# Patient Record
Sex: MALE | Race: WHITE | NOT HISPANIC OR LATINO | URBAN - METROPOLITAN AREA
[De-identification: names, ages, dates, MRNs, and addresses within clinical notes are randomized per-mention and may not be internally consistent; named-entity substitution may affect disease eponyms.]

---

## 2018-06-02 ENCOUNTER — EMERGENCY (EMERGENCY)
Facility: HOSPITAL | Age: 58
LOS: 1 days | Discharge: LEFT BEFORE DISCHARGE | End: 2018-06-02
Attending: EMERGENCY MEDICINE | Admitting: EMERGENCY MEDICINE
Payer: COMMERCIAL

## 2018-06-02 VITALS — RESPIRATION RATE: 24 BRPM | DIASTOLIC BLOOD PRESSURE: 94 MMHG | SYSTOLIC BLOOD PRESSURE: 168 MMHG | HEART RATE: 127 BPM

## 2018-06-02 VITALS
DIASTOLIC BLOOD PRESSURE: 85 MMHG | SYSTOLIC BLOOD PRESSURE: 167 MMHG | OXYGEN SATURATION: 100 % | RESPIRATION RATE: 16 BRPM | HEART RATE: 78 BPM | TEMPERATURE: 98 F

## 2018-06-02 LAB
ALBUMIN SERPL ELPH-MCNC: 4.3 G/DL — SIGNIFICANT CHANGE UP (ref 3.3–5)
ALP SERPL-CCNC: 77 U/L — SIGNIFICANT CHANGE UP (ref 40–120)
ALT FLD-CCNC: 16 U/L — SIGNIFICANT CHANGE UP (ref 4–41)
APAP SERPL-MCNC: < 15 UG/ML — LOW (ref 15–25)
APTT BLD: 26.1 SEC — LOW (ref 27.5–37.4)
AST SERPL-CCNC: 16 U/L — SIGNIFICANT CHANGE UP (ref 4–40)
BASE EXCESS BLDV CALC-SCNC: 3.2 MMOL/L — SIGNIFICANT CHANGE UP
BASOPHILS # BLD AUTO: 0.03 K/UL — SIGNIFICANT CHANGE UP (ref 0–0.2)
BASOPHILS NFR BLD AUTO: 0.4 % — SIGNIFICANT CHANGE UP (ref 0–2)
BILIRUB SERPL-MCNC: 0.4 MG/DL — SIGNIFICANT CHANGE UP (ref 0.2–1.2)
BLOOD GAS VENOUS - CREATININE: 0.78 MG/DL — SIGNIFICANT CHANGE UP (ref 0.5–1.3)
BUN SERPL-MCNC: 18 MG/DL — SIGNIFICANT CHANGE UP (ref 7–23)
CALCIUM SERPL-MCNC: 8.9 MG/DL — SIGNIFICANT CHANGE UP (ref 8.4–10.5)
CHLORIDE BLDV-SCNC: 107 MMOL/L — SIGNIFICANT CHANGE UP (ref 96–108)
CHLORIDE SERPL-SCNC: 102 MMOL/L — SIGNIFICANT CHANGE UP (ref 98–107)
CO2 SERPL-SCNC: 25 MMOL/L — SIGNIFICANT CHANGE UP (ref 22–31)
CREAT SERPL-MCNC: 0.88 MG/DL — SIGNIFICANT CHANGE UP (ref 0.5–1.3)
EOSINOPHIL # BLD AUTO: 0.1 K/UL — SIGNIFICANT CHANGE UP (ref 0–0.5)
EOSINOPHIL NFR BLD AUTO: 1.2 % — SIGNIFICANT CHANGE UP (ref 0–6)
ETHANOL BLD-MCNC: < 10 MG/DL — SIGNIFICANT CHANGE UP
GAS PNL BLDV: 135 MMOL/L — LOW (ref 136–146)
GLUCOSE BLDV-MCNC: 221 — HIGH (ref 70–99)
GLUCOSE SERPL-MCNC: 229 MG/DL — HIGH (ref 70–99)
HCO3 BLDV-SCNC: 25 MMOL/L — SIGNIFICANT CHANGE UP (ref 20–27)
HCT VFR BLD CALC: 42.7 % — SIGNIFICANT CHANGE UP (ref 39–50)
HCT VFR BLDV CALC: 47.2 % — SIGNIFICANT CHANGE UP (ref 39–51)
HGB BLD-MCNC: 14.5 G/DL — SIGNIFICANT CHANGE UP (ref 13–17)
HGB BLDV-MCNC: 15.4 G/DL — SIGNIFICANT CHANGE UP (ref 13–17)
IMM GRANULOCYTES # BLD AUTO: 0.02 # — SIGNIFICANT CHANGE UP
IMM GRANULOCYTES NFR BLD AUTO: 0.2 % — SIGNIFICANT CHANGE UP (ref 0–1.5)
INR BLD: 1.01 — SIGNIFICANT CHANGE UP (ref 0.88–1.17)
LACTATE BLDV-MCNC: 1.4 MMOL/L — SIGNIFICANT CHANGE UP (ref 0.5–2)
LYMPHOCYTES # BLD AUTO: 1.99 K/UL — SIGNIFICANT CHANGE UP (ref 1–3.3)
LYMPHOCYTES # BLD AUTO: 23.5 % — SIGNIFICANT CHANGE UP (ref 13–44)
MCHC RBC-ENTMCNC: 30 PG — SIGNIFICANT CHANGE UP (ref 27–34)
MCHC RBC-ENTMCNC: 34 % — SIGNIFICANT CHANGE UP (ref 32–36)
MCV RBC AUTO: 88.4 FL — SIGNIFICANT CHANGE UP (ref 80–100)
MONOCYTES # BLD AUTO: 0.58 K/UL — SIGNIFICANT CHANGE UP (ref 0–0.9)
MONOCYTES NFR BLD AUTO: 6.8 % — SIGNIFICANT CHANGE UP (ref 2–14)
NEUTROPHILS # BLD AUTO: 5.76 K/UL — SIGNIFICANT CHANGE UP (ref 1.8–7.4)
NEUTROPHILS NFR BLD AUTO: 67.9 % — SIGNIFICANT CHANGE UP (ref 43–77)
NRBC # FLD: 0 — SIGNIFICANT CHANGE UP
PCO2 BLDV: 50 MMHG — SIGNIFICANT CHANGE UP (ref 41–51)
PH BLDV: 7.37 PH — SIGNIFICANT CHANGE UP (ref 7.32–7.43)
PLATELET # BLD AUTO: 309 K/UL — SIGNIFICANT CHANGE UP (ref 150–400)
PMV BLD: 9.9 FL — SIGNIFICANT CHANGE UP (ref 7–13)
PO2 BLDV: 32 MMHG — LOW (ref 35–40)
POTASSIUM BLDV-SCNC: 3.7 MMOL/L — SIGNIFICANT CHANGE UP (ref 3.4–4.5)
POTASSIUM SERPL-MCNC: 3.9 MMOL/L — SIGNIFICANT CHANGE UP (ref 3.5–5.3)
POTASSIUM SERPL-SCNC: 3.9 MMOL/L — SIGNIFICANT CHANGE UP (ref 3.5–5.3)
PROT SERPL-MCNC: 7.1 G/DL — SIGNIFICANT CHANGE UP (ref 6–8.3)
PROTHROM AB SERPL-ACNC: 11.6 SEC — SIGNIFICANT CHANGE UP (ref 9.8–13.1)
RBC # BLD: 4.83 M/UL — SIGNIFICANT CHANGE UP (ref 4.2–5.8)
RBC # FLD: 12.5 % — SIGNIFICANT CHANGE UP (ref 10.3–14.5)
SALICYLATES SERPL-MCNC: < 5 MG/DL — LOW (ref 15–30)
SAO2 % BLDV: 57.4 % — LOW (ref 60–85)
SODIUM SERPL-SCNC: 140 MMOL/L — SIGNIFICANT CHANGE UP (ref 135–145)
TROPONIN T SERPL-MCNC: < 0.06 NG/ML — SIGNIFICANT CHANGE UP (ref 0–0.06)
TROPONIN T SERPL-MCNC: < 0.06 NG/ML — SIGNIFICANT CHANGE UP (ref 0–0.06)
WBC # BLD: 8.48 K/UL — SIGNIFICANT CHANGE UP (ref 3.8–10.5)
WBC # FLD AUTO: 8.48 K/UL — SIGNIFICANT CHANGE UP (ref 3.8–10.5)

## 2018-06-02 PROCEDURE — 71045 X-RAY EXAM CHEST 1 VIEW: CPT | Mod: 26

## 2018-06-02 PROCEDURE — 99285 EMERGENCY DEPT VISIT HI MDM: CPT | Mod: 25

## 2018-06-02 PROCEDURE — 93010 ELECTROCARDIOGRAM REPORT: CPT

## 2018-06-02 RX ORDER — ASPIRIN/CALCIUM CARB/MAGNESIUM 324 MG
324 TABLET ORAL ONCE
Qty: 0 | Refills: 0 | Status: COMPLETED | OUTPATIENT
Start: 2018-06-02 | End: 2018-06-02

## 2018-06-02 RX ORDER — SODIUM CHLORIDE 9 MG/ML
1000 INJECTION INTRAMUSCULAR; INTRAVENOUS; SUBCUTANEOUS ONCE
Qty: 0 | Refills: 0 | Status: COMPLETED | OUTPATIENT
Start: 2018-06-02 | End: 2018-06-02

## 2018-06-02 RX ADMIN — SODIUM CHLORIDE 1000 MILLILITER(S): 9 INJECTION INTRAMUSCULAR; INTRAVENOUS; SUBCUTANEOUS at 18:10

## 2018-06-02 RX ADMIN — Medication 324 MILLIGRAM(S): at 18:15

## 2018-06-02 RX ADMIN — Medication 1 MILLIGRAM(S): at 18:10

## 2018-06-02 NOTE — ED ADULT NURSE NOTE - CAS ED AMA FORM SIGNED YN
Yes/Pt A&Ox4, Patient educated about risks of leaving and understands they are leaving against medical advice.

## 2018-06-02 NOTE — ED PROVIDER NOTE - OBJECTIVE STATEMENT
Attending Bere: 57M with pmh DM (takes 20 u humalin, qam, qhs, humalong sliding scale with meals) presents with episode of emotional distress, mid-sternal chest pain, palpitations, and near syncope. Onset Early this afternoon. Started after he found out his gf is cheating on him. Pt also noted that his wbg was elevated at that time, >500, he took 20 u humalog to try and bring down. Denies having taken to harm self. States may have missed insulin dose with lunch. Chest pain mid-sterna, tight, severe, radiated somewhat to L shoulder, no radiation to back. Also with palpitations, sob, and shaking. Denies si/hi, auditory or visual hallucinations. Has been under great deal of stress recently, started paxil a few weeks ago but d/c'd a few days ago. Pt initially symptomatic with chest pain, sob, was crying, symptoms resolved s/p ativan and verbal de-escalation. Denies recent illnesses f/c, n/v/d, abd pain. Attending Bere: 57M with pmh DM (takes 20 u humalin, qam, qhs, humalong sliding scale with meals) presents with episode of emotional distress, mid-sternal chest pain, palpitations, and near syncope. Onset Early this afternoon. Started after he found out his gf is cheating on him. Pt also noted that his wbg was elevated at that time, >500, he took 20 u humalog to try and bring down. Denies having taken to harm self. States may have missed insulin dose with lunch. Chest pain mid-sterna, tight, severe, radiated somewhat to L shoulder, no radiation to back. Also with palpitations, sob, and shaking. Denies si/hi, auditory or visual hallucinations. Has been under great deal of stress recently, started paxil a few weeks ago but d/c'd a few days ago. Pt initially symptomatic with chest pain, sob, was crying, symptoms resolved s/p ativan and verbal de-escalation. Denies recent illnesses f/c, n/v/d, abd pain. Pt denies SI/HI, auditory or visual hallucinations.

## 2018-06-02 NOTE — ED PROVIDER NOTE - MEDICAL DECISION MAKING DETAILS
57M with IDDM presents with chest pain, sob, palpitations, s/p emotional distress. On arrival pt emotionally distraught, complaining of severe chest pain, sob, palpitations. S/p ativan and verbal de-escalation sx resolved. EKg shows sinus tachycardia without sig st changes. Check CBC eval for anemia, cmp eval for metabolic derangement. Troponin x 2. CXR. Give asa. Trend wbg. Plan to re-evaluate. - Jose Blackburn MD 57M with IDDM presents with chest pain, sob, palpitations, s/p emotional distress. On arrival pt emotionally distraught, complaining of severe chest pain, sob, palpitations. S/p ativan and verbal de-escalation sx resolved. EKg shows sinus tachycardia without sig st changes. Check CBC eval for anemia, cmp eval for metabolic derangement. Troponin x 2. CXR. Give asa. Neuro intact when calm, with extremely low suspicion cns pathology. Trend wbg. Plan to re-evaluate. - Jose Blackburn MD

## 2018-06-02 NOTE — ED PROVIDER NOTE - PHYSICAL EXAMINATION
PE recorded by attending Bere    Neuro exam once calm: CN II-XII intact. Visual fields intact. EOMI, PERRLA. Facial sensation equal bilat. Smile and eye closure equal bilat. Hearing intact bilat. Palate elevation equal, tongue protrusion midline. Lateral head rotation equal bilat. 5/5 strength UE and LE bilat. Sensation grossly intact. No pronator drift.

## 2018-06-02 NOTE — ED PROVIDER NOTE - PROGRESS NOTE DETAILS
DO Renan. Patient refusing to stay for repeat troponin and requesting to be discharged. Patient feeling improved and currently without chest pain. Would like to sign out AMA. Patient understands that given his chest pain on arrival, even though it was in setting of life stressor, he is at risk of having major cardiac event. He understands that he is at risk of death and is leaving the hospital without complete evaluation. Patient advised to return to emergency department if there is any further concerns. Provided cardiology follow up list. The patient is leaving against my medical advice. I have informed the patient about the risks, benefits, and alternatives to the evaluation and treatment of their condition. I have specifically noted the desire to obtain a repeat troponin given patient's chest pain at time of presentation, although pain now resolved. I also have noted the possibility that a life threatening illness could be missed which could result in death or disability. The alternative of obtaining a second troponin was discussed, patient unwilling to have drawn. Pt is agreeable to repeat ekg at this time. The patient reports understanding of these issues and demonstrates the capacity and insight to make important medical decisions. The necessity to follow up with cardiology within 2-3 days was explained. The patient understands that this decision to leave against medical advice can be changed at any time and the patient can return for re-evaluation and further treatment with any changes in condition or concerns.  The patient understands all the reasons to return to the Emergency Department, including any concerns at all. The patient is leaving against my medical advice. I have informed the patient about the risks, benefits, and alternatives to the evaluation and treatment of their condition. I have specifically noted the desire to obtain a repeat troponin given patient's chest pain at time of presentation, although pain now resolved. I also have noted the possibility that a life threatening illness could be missed which could result in death or disability. The alternative of obtaining a second troponin was discussed, patient unwilling to have drawn. Pt is agreeable to repeat ekg at this time. The patient reports understanding of these issues and demonstrates the capacity and insight to make important medical decisions. The necessity to follow up with cardiology within 2-3 days was explained. The patient understands that this decision to leave against medical advice can be changed at any time and the patient can return for re-evaluation and further treatment with any changes in condition or concerns.  The patient understands all the reasons to return to the Emergency Department, including any concerns at all. - Jose Blackburn MD

## 2018-06-02 NOTE — ED PROVIDER NOTE - DISCUSSED CLINICAL AND RADIOLOGICAL FINDINGS WITH, MDM
-Neuro consulted. Does not appear to have any advancement of symptoms.   -No interventions necessary per neuro  -- Continue Neurontin / Vitamin D supplementation     patient

## 2018-06-05 PROBLEM — Z00.00 ENCOUNTER FOR PREVENTIVE HEALTH EXAMINATION: Status: ACTIVE | Noted: 2018-06-05

## 2019-03-06 ENCOUNTER — APPOINTMENT (OUTPATIENT)
Dept: RADIOLOGY | Facility: CLINIC | Age: 59
End: 2019-03-06
Payer: COMMERCIAL

## 2019-03-06 ENCOUNTER — OUTPATIENT (OUTPATIENT)
Dept: OUTPATIENT SERVICES | Facility: HOSPITAL | Age: 59
LOS: 1 days | End: 2019-03-06
Payer: COMMERCIAL

## 2019-03-06 DIAGNOSIS — M67.912 UNSPECIFIED DISORDER OF SYNOVIUM AND TENDON, LEFT SHOULDER: ICD-10-CM

## 2019-03-06 DIAGNOSIS — M67.911 UNSPECIFIED DISORDER OF SYNOVIUM AND TENDON, RIGHT SHOULDER: ICD-10-CM

## 2019-03-06 PROCEDURE — 73030 X-RAY EXAM OF SHOULDER: CPT | Mod: 26,50

## 2019-03-06 PROCEDURE — 72050 X-RAY EXAM NECK SPINE 4/5VWS: CPT | Mod: 26

## 2019-03-06 PROCEDURE — 73030 X-RAY EXAM OF SHOULDER: CPT

## 2019-03-06 PROCEDURE — 72050 X-RAY EXAM NECK SPINE 4/5VWS: CPT

## 2021-02-16 ENCOUNTER — RESULT REVIEW (OUTPATIENT)
Age: 61
End: 2021-02-16

## 2021-09-14 ENCOUNTER — RESULT REVIEW (OUTPATIENT)
Age: 61
End: 2021-09-14